# Patient Record
Sex: MALE | Race: WHITE | Employment: FULL TIME | URBAN - METROPOLITAN AREA
[De-identification: names, ages, dates, MRNs, and addresses within clinical notes are randomized per-mention and may not be internally consistent; named-entity substitution may affect disease eponyms.]

---

## 2021-03-01 ENCOUNTER — HOSPITAL ENCOUNTER (OUTPATIENT)
Age: 46
Discharge: HOME | End: 2021-03-01
Payer: COMMERCIAL

## 2021-03-01 VITALS
DIASTOLIC BLOOD PRESSURE: 92 MMHG | TEMPERATURE: 97 F | OXYGEN SATURATION: 97 % | RESPIRATION RATE: 20 BRPM | SYSTOLIC BLOOD PRESSURE: 137 MMHG | HEART RATE: 120 BPM

## 2021-03-01 DIAGNOSIS — S61.214A LACERATION OF RIGHT RING FINGER WITHOUT FOREIGN BODY WITHOUT DAMAGE TO NAIL, INITIAL ENCOUNTER: Primary | ICD-10-CM

## 2021-03-01 PROCEDURE — 12001 RPR S/N/AX/GEN/TRNK 2.5CM/<: CPT

## 2021-03-01 PROCEDURE — 90471 IMMUNIZATION ADMIN: CPT

## 2021-03-01 PROCEDURE — 99203 OFFICE O/P NEW LOW 30 MIN: CPT

## 2021-03-01 NOTE — ED INITIAL ASSESSMENT (HPI)
Laceration to right 4th finger from a knife while washing dishes PTA. Bleeding controlled. + distal CMS. Last TDaP unknown.

## 2021-03-01 NOTE — ED PROVIDER NOTES
Patient Seen in: [facility]      History   Patient presents with:  Laceration/Abrasion    Stated Complaint: Right hand finger cut     HPI/Subjective:   HPI    69-year-old male who is right-hand dominant here for evaluation of right ring finge General: Abdomen is flat. Musculoskeletal: Normal range of motion. Hands:    Skin:     General: Skin is warm. Neurological:      General: No focal deficit present. Mental Status: He is alert and oriented to person, place, and time.    Psy

## 2021-03-08 ENCOUNTER — HOSPITAL ENCOUNTER (OUTPATIENT)
Age: 46
Discharge: HOME | End: 2021-03-08
Attending: EMERGENCY MEDICINE
Payer: COMMERCIAL

## 2021-03-08 VITALS
OXYGEN SATURATION: 98 % | SYSTOLIC BLOOD PRESSURE: 129 MMHG | RESPIRATION RATE: 18 BRPM | HEART RATE: 75 BPM | TEMPERATURE: 98 F | DIASTOLIC BLOOD PRESSURE: 78 MMHG

## 2021-03-08 DIAGNOSIS — S61.219D FINGER LACERATION, SUBSEQUENT ENCOUNTER: Primary | ICD-10-CM

## 2021-03-08 DIAGNOSIS — Z48.02 ENCOUNTER FOR REMOVAL OF SUTURES: ICD-10-CM

## 2021-03-08 NOTE — ED INITIAL ASSESSMENT (HPI)
PATIENT ARRIVED AMBULATORY TO ROOM FOR SUTURE REMOVAL. PATIENT HAD SUTURES PLACED TO THE FINGER ON 3/1. NO REDNESS, SWELLING OR DRAINAGE.

## 2021-03-08 NOTE — ED PROVIDER NOTES
Patient Seen in: [facility]      History   Patient presents with:  Suture Removal    Stated Complaint: STITCHES TAKEN OUT    HPI/Subjective:   HPI    Patient presents for wound check and suture removal of right ring finger laceration sustaine - No data to display       2 sutures removed easily by myself no evidence of infection wound care instructions given         MDM                               Disposition and Plan     Clinical Impression:  Finger laceration, subsequent encounter  (primary

## 2023-01-06 ENCOUNTER — HOSPITAL ENCOUNTER (OUTPATIENT)
Age: 48
Discharge: HOME | End: 2023-01-06
Attending: EMERGENCY MEDICINE
Payer: COMMERCIAL

## 2023-01-06 VITALS
TEMPERATURE: 98 F | SYSTOLIC BLOOD PRESSURE: 149 MMHG | DIASTOLIC BLOOD PRESSURE: 93 MMHG | HEART RATE: 70 BPM | RESPIRATION RATE: 18 BRPM | OXYGEN SATURATION: 99 %

## 2023-01-06 DIAGNOSIS — U07.1 COVID-19: Primary | ICD-10-CM

## 2023-01-06 LAB
POCT INFLUENZA A: NEGATIVE
POCT INFLUENZA B: NEGATIVE
SARS-COV-2 RNA RESP QL NAA+PROBE: DETECTED

## 2023-01-06 PROCEDURE — 87502 INFLUENZA DNA AMP PROBE: CPT | Performed by: EMERGENCY MEDICINE

## 2023-01-06 PROCEDURE — 99213 OFFICE O/P EST LOW 20 MIN: CPT

## 2023-01-06 RX ORDER — NIRMATRELVIR AND RITONAVIR 300-100 MG
KIT ORAL
Qty: 30 TABLET | Refills: 0 | Status: SHIPPED | OUTPATIENT
Start: 2023-01-06

## 2024-07-18 ENCOUNTER — APPOINTMENT (OUTPATIENT)
Dept: GENERAL RADIOLOGY | Age: 49
End: 2024-07-18
Attending: EMERGENCY MEDICINE
Payer: COMMERCIAL

## 2024-07-18 ENCOUNTER — HOSPITAL ENCOUNTER (OUTPATIENT)
Age: 49
Discharge: HOME | End: 2024-07-18
Attending: EMERGENCY MEDICINE
Payer: COMMERCIAL

## 2024-07-18 ENCOUNTER — APPOINTMENT (OUTPATIENT)
Dept: ULTRASOUND IMAGING | Age: 49
End: 2024-07-18
Attending: EMERGENCY MEDICINE
Payer: COMMERCIAL

## 2024-07-18 VITALS
SYSTOLIC BLOOD PRESSURE: 150 MMHG | OXYGEN SATURATION: 98 % | DIASTOLIC BLOOD PRESSURE: 102 MMHG | RESPIRATION RATE: 16 BRPM | HEART RATE: 78 BPM | TEMPERATURE: 98 F

## 2024-07-18 DIAGNOSIS — I10 HYPERTENSION, UNSPECIFIED TYPE: ICD-10-CM

## 2024-07-18 DIAGNOSIS — S76.011A HIP STRAIN, RIGHT, INITIAL ENCOUNTER: Primary | ICD-10-CM

## 2024-07-18 PROCEDURE — 99214 OFFICE O/P EST MOD 30 MIN: CPT

## 2024-07-18 PROCEDURE — 73502 X-RAY EXAM HIP UNI 2-3 VIEWS: CPT | Performed by: EMERGENCY MEDICINE

## 2024-07-18 PROCEDURE — 93971 EXTREMITY STUDY: CPT | Performed by: EMERGENCY MEDICINE

## 2024-07-18 NOTE — ED PROVIDER NOTES
Patient Seen in: [facility] [place]      History     Chief Complaint   Patient presents with    Leg Pain     Stated Complaint: righ leg pain    Subjective:   HPI    The patient is a 49-year-old male with past history of hypertension who presents now with right leg pain.  The patient states he works as a  and spends a lot of time sitting.  Patient states over the last several days, he has developed some pain that seems to initiate in the lateral aspect of the right hip and radiates down the right thigh area.  Patient describes the pain as an aching sensation.  Patient states pain worsens somewhat with ambulation and going up and down steps.  Patient denies any noted obvious swelling.  Patient states if he stands and leans to the right he feels a \"pulling\" sensation down the lateral aspect of the right thigh.  Patient has been taking anti-inflammatories with minimal improvement    Objective:   Past Medical History:    Unspecified essential hypertension              History reviewed. No pertinent surgical history.             Social History     Socioeconomic History    Marital status: Single   Tobacco Use    Smoking status: Never    Smokeless tobacco: Never   Vaping Use    Vaping status: Never Used   Substance and Sexual Activity    Alcohol use: No    Drug use: No              Review of Systems    Positive for stated Chief Complaint: Leg Pain    Other systems are as noted in HPI.  Constitutional and vital signs reviewed.      All other systems reviewed and negative except as noted above.    Physical Exam     ED Triage Vitals [07/18/24 1041]   BP (!) 165/87   Pulse 78   Resp 16   Temp 98.3 °F (36.8 °C)   Temp src Temporal   SpO2 98 %   O2 Device None (Room air)       Current Vitals:   Vital Signs  BP: -- (reports he has not taken BP meds yet today)  Pulse: 78  Resp: 16  Temp: 98.3 °F (36.8 °C)  Temp src: Temporal    Oxygen Therapy  SpO2: 98 %  O2 Device: None (Room air)            Physical  Exam    Constitutional: Well-developed well-nourished in no acute distress  Head: Normocephalic, no swelling or tenderness  Eyes: Nonicteric sclera, no conjunctival injection  Vascular: Palpable right posterior tibial pulse  Neurologic: Patient is awake, alert and oriented ×3.  The patient's motor strength is 5 out of 5 and symmetric in the upper and lower extremities bilaterally  Extremities: There is minimal tenderness to the right lateral hip.  There is no focal bony tenderness of the femur, knee, lower leg.  Skin: No pallor, no redness or warmth to the touch      ED Course   Labs Reviewed - No data to display          Patient's x-ray was independently reviewed by myself.  There is some minimal right hip joint narrowing.  Patient's ultrasound images and the radiologist report demonstrating no DVT of the right lower extremity was reviewed by myself.    Patient's x-rays were discussed with the patient.  Recommend anti-inflammatories for pain, will provide with Ortho follow-up for any worsening right hip pain.  Probable musculoskeletal origin of the patient's pain.         MDM      Musculoskeletal strain versus DVT of the right lower                                   Medical Decision Making      Disposition and Plan     Clinical Impression:  1. Hip strain, right, initial encounter    2. Hypertension, unspecified type         Disposition:  Discharge  7/18/2024 11:57 am    Follow-up:  Sujata Barfield MD  [address],  [place] [address]  [phone]      Worsening hip pain          Medications Prescribed:  Current Discharge Medication List

## 2024-07-31 ENCOUNTER — TELEPHONE (OUTPATIENT)
Dept: ORTHOPEDICS CLINIC | Facility: CLINIC | Age: 49
End: 2024-07-31

## 2024-07-31 ENCOUNTER — OFFICE VISIT (OUTPATIENT)
Dept: ORTHOPEDICS CLINIC | Facility: CLINIC | Age: 49
End: 2024-07-31
Payer: COMMERCIAL

## 2024-07-31 VITALS — HEIGHT: 72 IN | BODY MASS INDEX: 28.85 KG/M2 | WEIGHT: 213 LBS

## 2024-07-31 DIAGNOSIS — M54.16 LUMBAR RADICULOPATHY: ICD-10-CM

## 2024-07-31 DIAGNOSIS — R29.898 RIGHT LEG WEAKNESS: Primary | ICD-10-CM

## 2024-07-31 PROCEDURE — 3008F BODY MASS INDEX DOCD: CPT | Performed by: PHYSICIAN ASSISTANT

## 2024-07-31 PROCEDURE — 99204 OFFICE O/P NEW MOD 45 MIN: CPT | Performed by: PHYSICIAN ASSISTANT

## 2024-07-31 RX ORDER — METHYLPREDNISOLONE 4 MG/1
TABLET ORAL
Qty: 1 EACH | Refills: 0 | Status: SHIPPED | OUTPATIENT
Start: 2024-07-31

## 2024-07-31 RX ORDER — CYCLOBENZAPRINE HCL 10 MG
10 TABLET ORAL 3 TIMES DAILY
Qty: 30 TABLET | Refills: 1 | Status: SHIPPED | OUTPATIENT
Start: 2024-07-31 | End: 2024-08-20

## 2024-07-31 NOTE — PROGRESS NOTES
[facility] New Patient Note    CC: Right thigh and leg pain    HPI: This 49 year old male presents today with complaints of right thigh and leg pain.  Onset of symptoms started approximately 2 weeks ago with no known injury.  He states that he noticed progressive pain in his right quad.  It occasionally goes down the leg.  He also notes associated weakness on the right side.  Pain is worse with going up stairs or up a ladder.  Pain also is worse when he sits for long period of time.  He is a  and does a lot of physical activity and sitting for long periods of time.  He has been seen and evaluated at the urgent care at which time x-rays were completed and they are available for me to review.  He has been taking ibuprofen which helps slightly.  He is here for further evaluation.    Past Medical History:    Unspecified essential hypertension     History reviewed. No pertinent surgical history.  Current Outpatient Medications   Medication Sig Dispense Refill    methylPREDNISolone (MEDROL) 4 MG Oral Tablet Therapy Pack As directed. 1 each 0    cyclobenzaprine 10 MG Oral Tab Take 1 tablet (10 mg total) by mouth 3 (three) times daily for 20 days. 30 tablet 1    nirmatrelvir&ritonavir 300/100 (PAXLOVID, 300/100,) 20 x 150 MG & 10 x 100MG Oral Tablet Therapy Pack Take two nirmatrelvir tablets (300mg) with one ritonavir tablet (100mg) together twice daily for 5 days. 30 tablet 0    naproxen (NAPROSYN) 500 MG Oral Tab Take 1 tablet (500 mg total) by mouth 2 (two) times daily. 30 tablet 2    Lisinopril-hydroCHLOROthiazide 20-12.5 MG Oral Tab Take 2 tablets by mouth daily. 60 tablet 11    atenolol 100 MG Oral Tab Take 1 tablet (100 mg total) by mouth daily. 30 tablet 11     No Known Allergies  Family History   Problem Relation Age of Onset    Diabetes Father 52    Hypertension Father     Heart Disorder Father         CAD/CABG    Stroke Father     Hypertension Mother     Lipids Mother     Hypertension Sister      Diabetes Maternal Grandfather     Cancer Neg      Social History     Occupational History    Not on file   Tobacco Use    Smoking status: Never    Smokeless tobacco: Never   Vaping Use    Vaping status: Never Used   Substance and Sexual Activity    Alcohol use: No    Drug use: No    Sexual activity: Not on file        ROS:  Complete review of symptoms was reviewed and is non-contributory to the chief complaint as mentioned above.      Physical Exam: He is a pleasant 49-year-old male in no acute distress.  He ambulates with minimal antalgic gait.  Exam of the right hip and lower extremity reveals that he has no pain with logroll.  No pain with hip flexion or internal rotation.  No pain with external rotation.  He has limited hip flexion to approximately 100 degrees.  He has weakness with resisted hip flexion and resisted straight leg raise.  Negative straight leg raise.  Sensation is present to light touch.  No tenderness to palpation about the hip.        Imaging: XR HIP W OR WO PELVIS 2 OR 3 VIEWS, RIGHT (CPT=73502)    Result Date: 7/18/2024  CONCLUSION:   No acute fracture or dislocation.  Minimal right hip joint narrowing, suggesting underlying cartilage loss.    Dictated by (CST): Kirsten Chaudhary MD on 7/18/2024 at 11:50 AM     Finalized by (CST): Kirsten Chaudhary MD on 7/18/2024 at 11:51 AM          US VENOUS DOPPLER LEG RIGHT - DIAG IMG (CPT=93971)    Result Date: 7/18/2024  CONCLUSION:  1. Negative right leg venous duplex examination. 2. No deep venous thrombosis.    Dictated by (CST): Daniel Torres MD on 7/18/2024 at 11:09 AM     Finalized by (CST): Daniel Torres MD on 7/18/2024 at 11:10 AM                 Assessment: Right hip and leg pain and weakness, possible radiculopathy      Plan: I discussed x-ray and exam findings with the patient.  He does exhibit some leg weakness, that may be related to an intra-articular source of the hip versus lumbar etiology.  I recommend going ahead with oral  anti-inflammatories including a Medrol Dosepak and muscle relaxers to see if this improves his symptoms.  Prescriptions were sent to the pharmacy.  If he is feeling better with conservative treatment, initiation of formal therapy may be considered in the next few weeks.  Work note was given and he will work with sitdown work only and no lifting.  We also can consider further evaluation of the lumbar source with x-rays and possible MRI.  He will follow-up with us in 3 weeks to see how he is progressing or sooner with questions, concerns, or worsening symptoms.  If he develops worsening pain or difficulty going to the bathroom, he will follow-up urgently.        Yessi Raygoza PA-C  Orthopedic Surgery   [address] [place] [id number]   t: [phone]  f: [phone]

## 2024-08-06 NOTE — TELEPHONE ENCOUNTER
Patient called needing assistance completing Release of Information. Patient will email Release of Information once completed. Patient also requested forms to be uploaded to [employer].

## 2024-08-06 NOTE — TELEPHONE ENCOUNTER
Type of Leave: Continuous  Reason for Leave: right leg pain/weakness  Start date of leave: 7/17/24-pending re-eval 8/28/24  How much time needed?:  6 weeks   Forms Due Date: asap  Was Fee and Turnaround info Given?: yes

## 2024-08-06 NOTE — TELEPHONE ENCOUNTER
Short term disability forms received. [employer] message sent for Authorization/Release of Information. Logged for processing.

## 2024-08-14 NOTE — TELEPHONE ENCOUNTER
Yessi,    Patient's employer could not accommodate light duty work due to his occupation of being a . Patient has been off work, pending re evaluation on 8/28/24. Do you support continuous leave?    Thank you,  Roseanna IRBY

## 2024-08-15 NOTE — TELEPHONE ENCOUNTER
Yessi,     *The ACKNOWLEDGE button has been moved to the top right ribbon*    Please sign off on form if you agree to: disability due to ongoing leg pain, 7/17/24-pending re eval on 8/28/24     (place your signature on the first page only)    -From your Inbasket, Highlight the patient and click Chart   -Double click the 7/31/24 Forms Completion telephone encounter  -Scroll down to the Media section   -Click the blue Hyperlink: Taurus Raygoza 8/15/24  -Click Acknowledge located in the top right ribbon/menu   -Drag the mouse into the blank space of the document and a + sign will appear. Left click to   electronically sign the document.     Thank you,    Roseanna IRBY

## 2024-08-28 ENCOUNTER — OFFICE VISIT (OUTPATIENT)
Dept: ORTHOPEDICS CLINIC | Facility: CLINIC | Age: 49
End: 2024-08-28
Payer: COMMERCIAL

## 2024-08-28 VITALS — WEIGHT: 213 LBS | BODY MASS INDEX: 28.85 KG/M2 | HEIGHT: 72 IN

## 2024-08-28 DIAGNOSIS — R29.898 RIGHT LEG WEAKNESS: Primary | ICD-10-CM

## 2024-08-28 PROCEDURE — 99213 OFFICE O/P EST LOW 20 MIN: CPT | Performed by: PHYSICIAN ASSISTANT

## 2024-08-28 PROCEDURE — 3008F BODY MASS INDEX DOCD: CPT | Performed by: PHYSICIAN ASSISTANT

## 2024-08-28 NOTE — PROGRESS NOTES
[facility] Progress Note      Chief Complaint:  Right thigh and leg pain      Subjective: Salvador Vergara is a 49 year old male who is here for follow-up of his right hip and thigh pain.  He was seen about a month ago and was given a Medrol Dosepak and cyclobenzaprine for his right thigh and leg pain and weakness.  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  Since his last visit, he has noticed improved pain in the right thigh and leg.  Pain is now localized  in the thigh and is worse with weightbearing activities.  He also notes some occasions of giving way.      Objective: Exam of the right hip and lower extremity reveals that he is nontender to palpation.  He has no pain with hip flexion.  He has discomfort with hip internal rotation to 20 degrees.  No pain with hip external rotation.  He has no weakness on resisted hip flexion.  No pain or weakness with resisted knee extension.  He has mild discomfort with resisted knee flexion.  Sensation is present to light touch.  No pain or weakness with resisted hip abduction.      Assessment: Right hip and thigh pain, possible early degenerative joint disease of the right hip      Plan: Overall he is doing better.  I recommend initiation of a course of physical therapy to help with pain and strengthening exercises.  We will continue his sitdown work only and will follow-up with me in 4 weeks for reevaluation in hopes of return to full duty.  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  He will continue with conservative management as discussed and follow-up sooner with any other questions, concerns, or worsening symptoms.        Yessi Raygoza PA-C  Orthopedic Surgery   [address] [place] [id number]   t: [phone]  f: [phone]           This document was partially prepared using Dragon [employer] voice recognition software.   Although every attempt is made to correct errors during dictation, discrepancies may still exist. Please contact me with any questions or clarifications.

## 2024-08-29 ENCOUNTER — PATIENT MESSAGE (OUTPATIENT)
Dept: ORTHOPEDICS CLINIC | Facility: CLINIC | Age: 49
End: 2024-08-29

## 2024-08-29 DIAGNOSIS — R29.898 RIGHT LEG WEAKNESS: Primary | ICD-10-CM

## 2024-08-29 DIAGNOSIS — M25.551 RIGHT HIP PAIN: ICD-10-CM

## 2024-08-29 NOTE — TELEPHONE ENCOUNTER
From: Salvador Vergara  To: Yessi Raygoza  Sent: 8/29/2024 8:30 AM CDT  Subject: Physical therapy order    Good morning. I called over to the [place] location regarding the physical therapy we talked about yesterday. They do not show the order for physical therapy in the system as of this morning. Would that order be put in the system today ???.

## 2024-09-10 ENCOUNTER — TELEPHONE (OUTPATIENT)
Dept: PHYSICAL THERAPY | Facility: HOSPITAL | Age: 49
End: 2024-09-10

## 2024-09-11 ENCOUNTER — OFFICE VISIT (OUTPATIENT)
Dept: PHYSICAL THERAPY | Age: 49
End: 2024-09-11
Attending: PHYSICIAN ASSISTANT
Payer: COMMERCIAL

## 2024-09-11 DIAGNOSIS — R29.898 RIGHT LEG WEAKNESS: Primary | ICD-10-CM

## 2024-09-11 DIAGNOSIS — M25.551 RIGHT HIP PAIN: ICD-10-CM

## 2024-09-11 PROCEDURE — 97530 THERAPEUTIC ACTIVITIES: CPT | Performed by: PHYSICAL THERAPIST

## 2024-09-11 PROCEDURE — 97161 PT EVAL LOW COMPLEX 20 MIN: CPT | Performed by: PHYSICAL THERAPIST

## 2024-09-11 NOTE — PROGRESS NOTES
LOWER EXTREMITY EVALUATION:     Diagnosis:   Right leg weakness (R29.898)  Right hip pain (M25.551)      Referring Provider: Yessi Raygoza PA-C Date of Evaluation:    9/11/2024    Precautions:  None Next MD visit:   9/26/2024  Date of Surgery: n/a     PATIENT SUMMARY   Salvador Vergara is a 49 year old male who presents to therapy today with complaints of R hip pain and weakness that started at end of July 2024 with no DONELL. 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 Most of the pain is in the R lateral hip but has improves since taking a steroid pack. Pt reports no lumbar injuries and denies any N/T. Pt reports most of the hip pain is with weight-bearing positions such as walking and negotiating steps.  Pt describes pain level current 3/10, at best 3/10, at worst 5-6/10.   Current functional limitations include negotiating stairs due to weakness, walking for long periods of time.    Salvador describes prior level of function as good. Pt goals include negotiating stairs for return to work and being able to walk without weakness or pain.  Past medical history was reviewed with Salvador. Significant findings include HTN.    ASSESSMENT  Salvador presents to physical therapy evaluation with primary c/o R hip pain and weakness. The results of the objective tests and measures show decreased R hip ROM, hip stabilizer strength, LE muscle length, mild iliofemoral joint mobility restrictions and abnormal gait.  Functional deficits include but are not limited to negotiating stairs for getting into his home and in the truck for work and walking for long periods of time.  Signs and symptoms are consistent with diagnosis of Right leg weakness (R29.898), Right hip pain (M25.551). Pt and PT discussed evaluation findings, pathology, POC and HEP.  Pt voiced understanding and performs HEP correctly without reported pain. Skilled  Physical Therapy is medically necessary to address the above impairments and reach functional goals.     OBJECTIVE:   Observation: Pt ambulates with decreased WB on RLE, stopped posture  Palpation: TTP: R :  Gluteus Medius, Greater Trochanter, ITB  Sensation: WNL    AROM: (* denotes performed with pain)  Hip   Flexion: R 85*/120; L 85/120  Extension: R 10*/15; L 10/15  Abduction: R 45*/45; L 37/45  ER: R 45/60; L 45/60  IR: R 20*/45; L 30/45     Accessory motion: R  Long Axis Hip Distraction: mild hypomobility  Inferior Glide of Femur on Acetabulum: normal mobility  Inferior Lateral Glide of Femur on Acetabulum: normal mobility    Flexibility: Hip/LE     R L   Hamstrings moderately restricted moderately restricted   Piriformis moderately restricted moderately restricted   Hip Flexor moderately restricted moderately restricted   TFL moderately restricted moderately restricted   Quads moderately restricted moderately restricted   Gastrocs moderately restricted moderately restricted       Strength/MMT: (* denotes performed with pain)  Hip Knee Foot/Ankle   Flexion: R 4*/5; L 5/5  Abduction: R 4/5; L 5/5  ER: R 4/5; L 5/5  IR: R 4*/5; L 5/5 Flexion: R 4+*/5; L 5/5  Extension: R 4+/5; L 5/5    DF: R 5/5; L 5/5  PF: R 5/5; L 5/5     Special tests:   FADIR test: (-)  Hip Quadrant Test: (-)    Gait: pt ambulates on level ground with decreased hip/knee flex or ext R and stooped posture/forward lean  Balance: SLS: R 25 with pain and weakness sec, L 30 sec    Today’s Treatment and Response:   Pt education was provided on exam findings, treatment diagnosis, treatment plan, expectations, and prognosis. Pt was also provided recommendations for possible soreness after evaluation and importance of remaining active.  Patient was instructed in and issued a HEP for:   Access Code: [email or web address]  URL: [email or web address]  Date: 09/11/2024  Prepared by: Jessica Madsen    Exercises  - Supine March with Resistance Band  - 1  x daily - 7 x weekly - 2 sets - 10 reps  - Clamshell with Resistance  - 1 x daily - 7 x weekly - 3 sets - 10 reps  - Supine Piriformis Stretch with Foot on Ground  - 1 x daily - 7 x weekly - 3 sets - 30 second hold  - Standing Marching  - 1 x daily - 7 x weekly - 2 sets - 10 reps    Charges: PT Eval Low Complexity      Total Timed Treatment: 10 min     Total Treatment Time: 30 min   Therex: 0 minutes  Theract: 10 minutes  NMR: 0 minutes  Manual Therapy: 0 minutes    Based on clinical rationale and outcome measures, this evaluation involved Low Complexity decision making due to 1-2 personal factors/comorbidities,  body structures involved/activity limitations, and unstable symptoms including changing pain levels.  PLAN OF CARE:    Goals: (to be met in 5-8 visits)  Pt will have improved hip AROM Flex to 90+ deg and ABD to 45 deg to be able to don/doff shoes and perform car transfers without difficulty   Pt will improve hip ABD and ER strength to 5/5 to increase ease with standing and walking   Pt will be able to squat to  light objects around the house with <1/10 hip pain   Pt will improve functional hip strength to report ability to ascend/descend 1 flight of stairs reciprocally without use of handrail   Pt will demonstrate improved SLS to >30 seconds LEYLA to promote safety and decrease risk of falls on uneven surfaces such as grass and gravel   Pt will be independent and compliant with comprehensive HEP to m      Frequency / Duration: Patient will be seen for 2 x/week or a total of 5-8 visits over a 90 day period. Treatment will include: Gait training, Manual Therapy, Neuromuscular Re-education, Self-Care Home Management, Therapeutic Activities, Therapeutic Exercise, and Home Exercise Program instruction    Education or treatment limitation: None  Rehab Potential:good    Patient/Family/Caregiver was advised of these findings, precautions, and treatment options and has agreed to actively participate in planning  and for this course of care.    Thank you for your referral. Please co-sign or sign and return this letter via fax as soon as possible to [phone]. If you have any questions, please contact me at Dept: [phone]    Sincerely,  Electronically signed by therapist: Jessica Madsen PT  Physician's certification required: Yes  I certify the need for these services furnished under this plan of treatment and while under my care.    X___________________________________________________ Date____________________    Certification From: 9/11/2024  To:12/10/2024

## 2024-09-16 ENCOUNTER — OFFICE VISIT (OUTPATIENT)
Dept: PHYSICAL THERAPY | Age: 49
End: 2024-09-16
Attending: PHYSICIAN ASSISTANT
Payer: COMMERCIAL

## 2024-09-16 DIAGNOSIS — R29.898 RIGHT LEG WEAKNESS: Primary | ICD-10-CM

## 2024-09-16 DIAGNOSIS — M25.551 RIGHT HIP PAIN: ICD-10-CM

## 2024-09-16 PROCEDURE — 97110 THERAPEUTIC EXERCISES: CPT | Performed by: PHYSICAL THERAPIST

## 2024-09-16 PROCEDURE — 97530 THERAPEUTIC ACTIVITIES: CPT | Performed by: PHYSICAL THERAPIST

## 2024-09-16 PROCEDURE — 97140 MANUAL THERAPY 1/> REGIONS: CPT | Performed by: PHYSICAL THERAPIST

## 2024-09-16 NOTE — PROGRESS NOTES
Diagnosis:   Right leg weakness (R29.898)  Right hip pain (M25.551)        Referring Provider: Yessi Raygoza. QI Date of Evaluation:    9/11/2024    Precautions:  None Next MD visit:   9/26/2024  Date of Surgery: n/a   Insurance Primary/Secondary: [employer] OUT OF STATE / N/A     # Auth Visits: 5-8            Subjective: Pt reports \"the hip is feeling a little better, I am able to carry some things up the stairs at home, exercises are going well at home.\"    Pain: 4/10      Objective: See table below.      Assessment: Pt responded well to therapeutic interventions with some discomfort with hip flexion more than 90 degrees. Only 1 set of monster walking performed due to greater trochanter pain. 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      Goals:   (to be met in 5-8 visits)  Pt will have improved hip AROM Flex to 90+ deg and ABD to 45 deg to be able to don/doff shoes and perform car transfers without difficulty   Pt will improve hip ABD and ER strength to 5/5 to increase ease with standing and walking   Pt will be able to squat to  light objects around the house with <1/10 hip pain   Pt will improve functional hip strength to report ability to ascend/descend 1 flight of stairs reciprocally without use of handrail   Pt will demonstrate improved SLS to >30 seconds LEYLA to promote safety and decrease risk of falls on uneven surfaces such as grass and gravel   Pt will be independent and compliant with comprehensive HEP    Plan: Progress skilled PT as tolerated to help improve hip strength and mobility.   Date: 9/16/2024  TX#: 2/5-8 Date:                 TX#: 3/ Date:                 TX#: 4/ Date:                 TX#: 5/ Date:   Tx#: 6/   Therex: x25'  NuStep: x5' L3  HS Stretch: 3x30\" manually R  Piriformis Stretch:3x30\" manually R  Eccentric Leg Press: 1x15 R 4 cords  Forward  Step-Ups: 1x15 R  Seated Hip Flexion: 1x15 R 4#  Lateral Walking: 2x100 ft with GTB  Monster Walking: 1x100 ftt with        Theract: x10'  Bridges: 1x15  SL Clams: 2x10 R   Eccentric Step-Downs: 1x15 with LUE support       NMR: x0'       Manual Therapy: x10'  STW: R ITB, piriformis, hip flexor       HEP:   Exercises  - Supine March with Resistance Band  - 1 x daily - 7 x weekly - 2 sets - 10 reps  - Clamshell with Resistance  - 1 x daily - 7 x weekly - 3 sets - 10 reps  - Supine Piriformis Stretch with Foot on Ground  - 1 x daily - 7 x weekly - 3 sets - 30 second hold  - Standing Marching  - 1 x daily - 7 x weekly - 2 sets - 10 reps  - Sidelying Hip Abduction  - 1 x daily - 7 x weekly - 2 sets - 10 reps  - Step Up  - 1 x daily - 7 x weekly - 15 reps  - Forward Step Down  - 1 x daily - 7 x weekly - 1 sets - 15 reps    Charges: Therex: 25 minutes  Theract: 10 minutes  NMR: 0 minutes  Manual Therapy: 10 minutes  Total Timed Treatment: 45 min  Total Treatment Time: 45 min

## 2024-09-18 ENCOUNTER — OFFICE VISIT (OUTPATIENT)
Dept: PHYSICAL THERAPY | Age: 49
End: 2024-09-18
Attending: PHYSICIAN ASSISTANT
Payer: COMMERCIAL

## 2024-09-18 DIAGNOSIS — R29.898 RIGHT LEG WEAKNESS: Primary | ICD-10-CM

## 2024-09-18 DIAGNOSIS — M25.551 RIGHT HIP PAIN: ICD-10-CM

## 2024-09-18 PROCEDURE — 97530 THERAPEUTIC ACTIVITIES: CPT | Performed by: PHYSICAL THERAPIST

## 2024-09-18 PROCEDURE — 97140 MANUAL THERAPY 1/> REGIONS: CPT | Performed by: PHYSICAL THERAPIST

## 2024-09-18 PROCEDURE — 97112 NEUROMUSCULAR REEDUCATION: CPT | Performed by: PHYSICAL THERAPIST

## 2024-09-18 PROCEDURE — 97110 THERAPEUTIC EXERCISES: CPT | Performed by: PHYSICAL THERAPIST

## 2024-09-18 NOTE — PROGRESS NOTES
Diagnosis:   Right leg weakness (R29.898)  Right hip pain (M25.551)        Referring Provider: Yessi Raygoza. QI Date of Evaluation:    9/11/2024    Precautions:  None Next MD visit:   9/26/2024  Date of Surgery: n/a   Insurance Primary/Secondary: BCBS OUT OF STATE / N/A     # Auth Visits: 5-8            Subjective: Pt reports \"the hip is improving, I was a little sore after last session; however, I feel like the rolling helped, the stairs get better each time I do them.\"    Pain: 3/10      Objective: See table below.      Assessment: Pt responded well to therapeutic interventions with no increase in pain. Pt fatigued easily to seated hip flexion with hold at top. Added SLR to address quad and hip flexor strength. Quad shakiness noted with LSD today.       Goals:   (to be met in 5-8 visits)  Pt will have improved hip AROM Flex to 90+ deg and ABD to 45 deg to be able to don/doff shoes and perform car transfers without difficulty   Pt will improve hip ABD and ER strength to 5/5 to increase ease with standing and walking   Pt will be able to squat to  light objects around the house with <1/10 hip pain   Pt will improve functional hip strength to report ability to ascend/descend 1 flight of stairs reciprocally without use of handrail   Pt will demonstrate improved SLS to >30 seconds LEYLA to promote safety and decrease risk of falls on uneven surfaces such as grass and gravel   Pt will be independent and compliant with comprehensive HEP    Plan: Progress skilled PT as tolerated to help improve hip strength and mobility.   Date: 9/16/2024  TX#: 2/5-8 Date: 9/18/2024                TX#: 3/5-8 Date:                 TX#: 4/ Date:                 TX#: 5/ Date:   Tx#: 6/   Therex: x25'  NuStep: x5' L3  HS Stretch: 3x30\" manually R  Piriformis Stretch:3x30\" manually R  Eccentric Leg Press: 1x15 R 4 cords  Forward Step-Ups: 1x15 R  Seated Hip Flexion: 1x15 R 4#  Lateral Walking: 2x100 ft with GTB  Monster Walking:  1x100 ft with GTB Therex: x10'  NuStep: x5' L5  HS Stretch: 3x30\" manually R  Piriformis Stretch:3x30\" manually R  Eccentric Leg Press: 1x15 R 5 cords  Seated Hip Flexion: 1x15 R 4# hold 2\" at top  Lateral Walking: 2x100 ft with GTB - NP  Monster Walking: 1x100 ft with GTB - NP  Lateral Step-Ups and Overs: 1x10 with 6\" box      Theract: x10'  Bridges: 1x15  SL Clams: 2x10 R   Eccentric Step-Downs: 1x15 with LUE support Theract: x10'  Bridges: 1x15 with hip abduction and GTB  SL Clams: 2x10 R GTB  Eccentric Step-Downs: 1x15 with LUE support  SLR: 1x15 with 2# R  LSD: 1x10 4\" step      NMR: x0' NMR: x10'  Wobbleboard: 2x1' A/P and M/L  Step Up: 1x10 R with 2\" hold at top on Airex      Manual Therapy: x10'  STW: R ITB, piriformis, hip flexor Manual Therapy: x10'  STW: R ITB, piriformis, hip flexor      HEP:   Exercises  - Supine March with Resistance Band  - 1 x daily - 7 x weekly - 2 sets - 10 reps  - Clamshell with Resistance  - 1 x daily - 7 x weekly - 3 sets - 10 reps  - Supine Piriformis Stretch with Foot on Ground  - 1 x daily - 7 x weekly - 3 sets - 30 second hold  - Standing Marching  - 1 x daily - 7 x weekly - 2 sets - 10 reps  - Sidelying Hip Abduction  - 1 x daily - 7 x weekly - 2 sets - 10 reps  - Step Up  - 1 x daily - 7 x weekly - 15 reps  - Forward Step Down  - 1 x daily - 7 x weekly - 1 sets - 15 reps    Charges: Therex: 10 minutes  Theract: 10 minutes  NMR: 10 minutes  Manual Therapy: 10 minutes  Total Timed Treatment: 40 min  Total Treatment Time: 40 min

## 2024-09-23 ENCOUNTER — OFFICE VISIT (OUTPATIENT)
Dept: PHYSICAL THERAPY | Age: 49
End: 2024-09-23
Attending: PHYSICIAN ASSISTANT
Payer: COMMERCIAL

## 2024-09-23 DIAGNOSIS — R29.898 RIGHT LEG WEAKNESS: Primary | ICD-10-CM

## 2024-09-23 DIAGNOSIS — M25.551 RIGHT HIP PAIN: ICD-10-CM

## 2024-09-23 PROCEDURE — 97530 THERAPEUTIC ACTIVITIES: CPT | Performed by: PHYSICAL THERAPIST

## 2024-09-23 PROCEDURE — 97112 NEUROMUSCULAR REEDUCATION: CPT | Performed by: PHYSICAL THERAPIST

## 2024-09-23 PROCEDURE — 97110 THERAPEUTIC EXERCISES: CPT | Performed by: PHYSICAL THERAPIST

## 2024-09-23 PROCEDURE — 97140 MANUAL THERAPY 1/> REGIONS: CPT | Performed by: PHYSICAL THERAPIST

## 2024-09-23 NOTE — PROGRESS NOTES
Diagnosis:   Right leg weakness (R29.898)  Right hip pain (M25.551)        Referring Provider: Yessi Raygoza. QI Date of Evaluation:    9/11/2024    Precautions:  None Next MD visit:   9/26/2024  Date of Surgery: n/a   Insurance Primary/Secondary: BCBS OUT OF STATE / N/A     # Auth Visits: 5-8            Subjective: Pt reports \"the hip continues to improve, I only felt a little bit of soreness that did not last long after the last session - exercises are going well at home.\"    Pain: 0/10      Objective: See table below.      Assessment: Pt responded well to therapeutic interventions with no increase in pain. 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      Goals:   (to be met in 5-8 visits)  Pt will have improved hip AROM Flex to 90+ deg and ABD to 45 deg to be able to don/doff shoes and perform car transfers without difficulty   Pt will improve hip ABD and ER strength to 5/5 to increase ease with standing and walking   Pt will be able to squat to  light objects around the house with <1/10 hip pain   Pt will improve functional hip strength to report ability to ascend/descend 1 flight of stairs reciprocally without use of handrail   Pt will demonstrate improved SLS to >30 seconds LEYLA to promote safety and decrease risk of falls on uneven surfaces such as grass and gravel   Pt will be independent and compliant with comprehensive HEP    Plan: Progress skilled PT as tolerated to help improve hip strength and mobility.   Date: 9/16/2024  TX#: 2/5-8 Date: 9/18/2024                TX#: 3/5-8 Date: 9/23/2024                TX#: 4/5-8 Date:                 TX#: 5/ Date:   Tx#: 6/   Therex: x25'  NuStep: x5' L3  HS Stretch: 3x30\" manually R  Piriformis Stretch:3x30\" manually R  Eccentric Leg Press: 1x15 R 4 cords  Forward Step-Ups: 1x15 R  Seated Hip Flexion: 1x15 R 4#  Lateral Walking: 2x100 ft with  GTB  Monster Walking: 1x100 ft with GTB Therex: x10'  NuStep: x5' L5  HS Stretch: 3x30\" manually R  Piriformis Stretch:3x30\" manually R  Eccentric Leg Press: 1x15 R 5 cords  Seated Hip Flexion: 1x15 R 4# hold 2\" at top  Lateral Walking: 2x100 ft with GTB - NP  Monster Walking: 1x100 ft with GTB - NP  Lateral Step-Ups and Overs: 1x10 with 6\" box Therex: x10'  NuStep: x5' L5  HS Stretch: 3x30\" manually R  Piriformis Stretch:3x30\" manually R  SL Leg Press: 1x15 R 5 cords  Seated Hip Flexion: 1x15 R 4# hold 2\" at top  Lateral Walking: 2x100 ft with GTB - NP  Monster Walking: 1x100 ft with GTB - NP  Lateral Step-Ups and Overs: 1x10 with 6\" box     Theract: x10'  Bridges: 1x15  SL Clams: 2x10 R   Eccentric Step-Downs: 1x15 with LUE support Theract: x10'  Bridges: 1x15 with hip abduction and GTB  SL Clams: 2x10 R GTB  Eccentric Step-Downs: 1x15 with LUE support 6\" box  SLR: 1x15 with 2# R  LSD: 1x10 4\" step Theract: x10'  Bridges: 1x15 with hip abduction and GTB  SL Clams: 2x10 R GTB  Eccentric Step-Downs: 1x10 with LUE support   SLR: 1x15 with 2# R  LSD: 1x10 4\" step  Mini Squats: 1x15 at barre     NMR: x0' NMR: x10'  Wobbleboard: 2x1' A/P and M/L  Step Up: 1x10 R with 2\" hold at top on Airex NMR: x10'  Wobbleboard: 2x1' A/P and M/L  Step Ups: 1x10 R with 2\" hold at top on Airex     Manual Therapy: x10'  STW: R ITB, piriformis, hip flexor Manual Therapy: x10'  STW: R ITB, piriformis, hip flexor Manual Therapy: x10'  STW: R ITB, piriformis, hip flexor     HEP:   Exercises  - Supine March with Resistance Band  - 1 x daily - 7 x weekly - 2 sets - 10 reps  - Clamshell with Resistance  - 1 x daily - 7 x weekly - 3 sets - 10 reps  - Supine Piriformis Stretch with Foot on Ground  - 1 x daily - 7 x weekly - 3 sets - 30 second hold  - Standing Marching  - 1 x daily - 7 x weekly - 2 sets - 10 reps  - Sidelying Hip Abduction  - 1 x daily - 7 x weekly - 2 sets - 10 reps  - Step Up  - 1 x daily - 7 x weekly - 15 reps  - Forward Step  Down  - 1 x daily - 7 x weekly - 1 sets - 15 reps    Charges: Therex: 10 minutes  Theract: 10 minutes  NMR: 10 minutes  Manual Therapy: 10 minutes  Total Timed Treatment: 40 min  Total Treatment Time: 40 min

## 2024-09-25 ENCOUNTER — OFFICE VISIT (OUTPATIENT)
Dept: PHYSICAL THERAPY | Age: 49
End: 2024-09-25
Attending: PHYSICIAN ASSISTANT
Payer: COMMERCIAL

## 2024-09-25 DIAGNOSIS — M25.551 RIGHT HIP PAIN: ICD-10-CM

## 2024-09-25 DIAGNOSIS — R29.898 RIGHT LEG WEAKNESS: Primary | ICD-10-CM

## 2024-09-25 PROCEDURE — 97140 MANUAL THERAPY 1/> REGIONS: CPT | Performed by: PHYSICAL THERAPIST

## 2024-09-25 PROCEDURE — 97110 THERAPEUTIC EXERCISES: CPT | Performed by: PHYSICAL THERAPIST

## 2024-09-25 PROCEDURE — 97530 THERAPEUTIC ACTIVITIES: CPT | Performed by: PHYSICAL THERAPIST

## 2024-09-25 PROCEDURE — 97112 NEUROMUSCULAR REEDUCATION: CPT | Performed by: PHYSICAL THERAPIST

## 2024-09-25 NOTE — PROGRESS NOTES
Diagnosis:   Right leg weakness (R29.898)  Right hip pain (M25.551)        Referring Provider: Yessi Raygoza. QI Date of Evaluation:    9/11/2024    Precautions:  None Next MD visit:   9/26/2024  Date of Surgery: n/a   Insurance Primary/Secondary: BCBS OUT OF STATE / N/A     # Auth Visits: 5-8            Subjective: Pt reports \"the hip has improved 95% - I feel confident returning to work.\"    Pain: 0/10     Discharge Summary  Pt has attended 5 visits in Physical Therapy. Pt reports a 95% improvement in strength and symptoms after starting therapy. Pt reports he feels confident returning to work. Pt provided with comprehensive HEP for continued strengthening and stretching at home.      Objective: See table below.      Assessment: Pt responded well to therapeutic interventions with no increase in pain. 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      Goals:   (to be met in 5-8 visits)  Pt will have improved hip AROM Flex to 90+ deg and ABD to 45 deg to be able to don/doff shoes and perform car transfers without difficulty   Pt will improve hip ABD and ER strength to 5/5 to increase ease with standing and walking   Pt will be able to squat to  light objects around the house with <1/10 hip pain   Pt will improve functional hip strength to report ability to ascend/descend 1 flight of stairs reciprocally without use of handrail   Pt will demonstrate improved SLS to >30 seconds LEYLA to promote safety and decrease risk of falls on uneven surfaces such as grass and gravel   Pt will be independent and compliant with comprehensive HEP    Date: 9/16/2024  TX#: 2/5-8 Date: 9/18/2024                TX#: 3/5-8 Date: 9/23/2024                TX#: 4/5-8 Date: 9/25/2024                 TX#: 5/5-8 Date:   Tx#: 6/   Therex: x25'  NuStep: x5' L3  HS Stretch: 3x30\" manually R  Piriformis Stretch:3x30\" manually  R  Eccentric Leg Press: 1x15 R 4 cords  Forward Step-Ups: 1x15 R  Seated Hip Flexion: 1x15 R 4#  Lateral Walking: 2x100 ft with GTB  Monster Walking: 1x100 ft with GTB Therex: x10'  NuStep: x5' L5  HS Stretch: 3x30\" manually R  Piriformis Stretch:3x30\" manually R  Eccentric Leg Press: 1x15 R 5 cords  Seated Hip Flexion: 1x15 R 4# hold 2\" at top  Lateral Walking: 2x100 ft with GTB - NP  Monster Walking: 1x100 ft with GTB - NP  Lateral Step-Ups and Overs: 1x10 with 6\" box Therex: x10'  NuStep: x5' L5  HS Stretch: 3x30\" manually R  Piriformis Stretch:3x30\" manually R  SL Leg Press: 1x15 R 5 cords  Seated Hip Flexion: 1x15 R 4# hold 2\" at top  Lateral Walking: 2x100 ft with GTB - NP  Monster Walking: 1x100 ft with GTB - NP  Lateral Step-Ups and Overs: 1x10 with 6\" box Therex: x10'  NuStep: x5' L5  HS Stretch: 3x30\" manually R  Piriformis Stretch:3x30\" manually R  SL Leg Press: 1x15 R 5 cords  Seated Hip Flexion: 1x15 R 4# hold 2\" at top  Lateral Walking: 2x100 ft with GTB - NP  Monster Walking: 1x100 ft with GTB - NP  Lateral Step-Ups and Overs: 1x10 with 6\" box    Theract: x10'  Bridges: 1x15  SL Clams: 2x10 R   Eccentric Step-Downs: 1x15 with LUE support Theract: x10'  Bridges: 1x15 with hip abduction and GTB  SL Clams: 2x10 R GTB  Eccentric Step-Downs: 1x15 with LUE support 6\" box  SLR: 1x15 with 2# R  LSD: 1x10 4\" step Theract: x10'  Bridges: 1x15 with hip abduction and GTB  SL Clams: 2x10 R GTB  Eccentric Step-Downs: 1x10 with LUE support   SLR: 1x15 with 2# R  LSD: 1x10 4\" step  Mini Squats: 1x15 at barre Theract: x10'  Bridges: 1x15 with hip abduction and GTB  SL Clams: 2x10 R GTB  Eccentric Step-Downs: 1x10 with LUE support   SLR: 1x15 with 2# R  LSD: 1x10 4\" step  Mini Squats: 1x15 at barre    NMR: x0' NMR: x10'  Wobbleboard: 2x1' A/P and M/L  Step Up: 1x10 R with 2\" hold at top on Airex NMR: x10'  Wobbleboard: 2x1' A/P and M/L  Step Ups: 1x10 R with 2\" hold at top on Airex NMR: x10'  Wobbleboard: 2x1' A/P and  M/L  Step Ups: 1x10 R with 2\" hold at top on Airex    Manual Therapy: x10'  STW: R ITB, piriformis, hip flexor Manual Therapy: x10'  STW: R ITB, piriformis, hip flexor Manual Therapy: x10'  STW: R ITB, piriformis, hip flexor Manual Therapy: x10'  STW: R ITB, piriformis, hip flexor    HEP: Access Code: [email or web address]  URL: [email or web address]  Date: 09/25/2024  Prepared by: Jessica Madsen    Exercises  - Supine Hamstring Stretch with Strap  - 1 x daily - 7 x weekly - 3 sets - 30 second hold  - Supine Piriformis Stretch with Foot on Ground  - 1 x daily - 7 x weekly - 3 sets - 30 second hold  - Seated Hip Flexion March with Ankle Weights  - 1 x daily - 7 x weekly - 1 sets - 20 reps  - Lateral Step Down  - 1 x daily - 7 x weekly - 2 sets - 10 reps  - Step Up  - 1 x daily - 7 x weekly - 2 sets - 10 reps  - Forward Step Down  - 1 x daily - 7 x weekly - 2 sets - 10 reps  - Lateral Step Up  - 1 x daily - 7 x weekly - 2 sets - 10 reps  - Bridge with Hip Abduction and Resistance  - 1 x daily - 7 x weekly - 2 sets - 10 reps  - Active Straight Leg Raise with Quad Set  - 1 x daily - 7 x weekly - 2 sets - 10 reps  - Mini Squat with Counter Support  - 1 x daily - 7 x weekly - 2 sets - 10 reps    Charges: Therex: 10 minutes  Theract: 10 minutes  NMR: 10 minutes  Manual Therapy: 10 minutes  Total Timed Treatment: 40 min  Total Treatment Time: 40 min    Plan: Pt discharged from skilled PT. Pt provided with comprehensive HEP for continued strengthening and stretching at home.    Patient/Family/Caregiver was advised of these findings, precautions, and treatment options and has agreed to actively participate in planning and for this course of care.    Thank you for your referral. If you have any questions, please contact me at Dept: [phone].    Sincerely,  Electronically signed by therapist: Jessica Madsen PT     Physician's certification required:  No  Please co-sign or sign and return this letter via fax as soon as  possible to [phone].   I certify the need for these services furnished under this plan of treatment and while under my care.    X___________________________________________________ Date____________________    Certification From: 9/25/2024  To:12/24/2024

## 2024-09-26 ENCOUNTER — OFFICE VISIT (OUTPATIENT)
Dept: ORTHOPEDICS CLINIC | Facility: CLINIC | Age: 49
End: 2024-09-26
Payer: COMMERCIAL

## 2024-09-26 VITALS — HEIGHT: 72 IN | WEIGHT: 213 LBS | BODY MASS INDEX: 28.85 KG/M2

## 2024-09-26 DIAGNOSIS — M25.551 RIGHT HIP PAIN: Primary | ICD-10-CM

## 2024-09-26 PROCEDURE — 99213 OFFICE O/P EST LOW 20 MIN: CPT | Performed by: PHYSICIAN ASSISTANT

## 2024-09-26 PROCEDURE — 3008F BODY MASS INDEX DOCD: CPT | Performed by: PHYSICIAN ASSISTANT

## 2024-09-26 NOTE — PROGRESS NOTES
[facility] Progress Note      Chief Complaint:  Right thigh and leg pain      Subjective: Salvador Vergara is a 49 year old male who is here for follow-up of his right hip and thigh pain.  He has tried a Medrol Dosepak and cyclobenzaprine as well as formal physical therapy.  He states that his pain has completely resolved.  He is very happy with his progress in physical therapy.  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  He believes that he is able to return without restrictions.         Objective: Exam of the right hip and lower extremity reveals that he is nontender to palpation at the lateral hip.  He has no pain with hip flexion, internal or external rotation.  Negative straight leg raise.  Good strength with no pain on resisted hip flexion or abduction.  Sensation is present to light touch.      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  He will continue a home exercise program and is able to return to work without restrictions starting next week.  A work note was given.  He will follow-up with me with any worsening symptoms, questions or concerns.        Yessi Raygoza PA-C  Orthopedic Surgery   [address] [place] [id number]   t: [phone]  f: [phone]           This document was partially prepared using [employer] voice recognition software.  Although every attempt is made to correct errors during dictation, discrepancies may still exist. Please contact me with any questions or clarifications.

## 2024-10-03 ENCOUNTER — TELEPHONE (OUTPATIENT)
Dept: ORTHOPEDICS CLINIC | Facility: CLINIC | Age: 49
End: 2024-10-03

## 2024-10-03 NOTE — TELEPHONE ENCOUNTER
Return to work forms received via [employer] message on 10/2/24. [employer] message sent for authorization. Logged for processing.

## 2024-10-17 NOTE — TELEPHONE ENCOUNTER
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 Left message to call back. Form archived

## 2025-07-09 ENCOUNTER — HOSPITAL ENCOUNTER (OUTPATIENT)
Age: 50
Discharge: HOME | End: 2025-07-09
Attending: STUDENT IN AN ORGANIZED HEALTH CARE EDUCATION/TRAINING PROGRAM
Payer: COMMERCIAL

## 2025-07-09 ENCOUNTER — APPOINTMENT (OUTPATIENT)
Dept: ULTRASOUND IMAGING | Age: 50
End: 2025-07-09
Attending: STUDENT IN AN ORGANIZED HEALTH CARE EDUCATION/TRAINING PROGRAM
Payer: COMMERCIAL

## 2025-07-09 VITALS
OXYGEN SATURATION: 99 % | HEIGHT: 72 IN | TEMPERATURE: 98 F | WEIGHT: 220 LBS | BODY MASS INDEX: 29.8 KG/M2 | RESPIRATION RATE: 16 BRPM | HEART RATE: 52 BPM | DIASTOLIC BLOOD PRESSURE: 79 MMHG | SYSTOLIC BLOOD PRESSURE: 189 MMHG

## 2025-07-09 DIAGNOSIS — M79.604 PAIN OF RIGHT LOWER EXTREMITY: Primary | ICD-10-CM

## 2025-07-09 DIAGNOSIS — R03.0 ELEVATED BLOOD PRESSURE READING: ICD-10-CM

## 2025-07-09 PROCEDURE — 99214 OFFICE O/P EST MOD 30 MIN: CPT

## 2025-07-09 PROCEDURE — 93971 EXTREMITY STUDY: CPT | Performed by: RADIOLOGY

## 2025-07-09 RX ORDER — ACETAMINOPHEN 325 MG/1
650 TABLET ORAL ONCE
Status: COMPLETED | OUTPATIENT
Start: 2025-07-09 | End: 2025-07-09

## 2025-07-09 RX ORDER — NITROFURANTOIN 25; 75 MG/1; MG/1
100 CAPSULE ORAL 2 TIMES DAILY
Qty: 14 CAPSULE | Refills: 0 | Status: SHIPPED | OUTPATIENT
Start: 2025-07-09 | End: 2025-07-09 | Stop reason: CLARIF

## 2025-07-09 NOTE — DISCHARGE INSTRUCTIONS
The ultrasound shows no blood clot in the leg, but this does not assess the muscles, tendons, ligaments, and other structures which could also be injured.  I recommend rest, elevation when at rest, application of a heat pack 3 times a day, 10 minutes each time, with a barrier such as a towel between the skin and the heat pack so as to prevent skin injury, no heavy lifting, no straining, only light walking, until following up with your primary care physician in the next 3 to 4 days for reassessment and for further recommendations.  With any new, changing, progressing signs or symptoms, I do recommend immediate assessment.    Your blood pressure was elevated today for which I recommend follow-up with your primary care physician for reassessment and for further recommendations

## 2025-07-09 NOTE — ED INITIAL ASSESSMENT (HPI)
Pt presents to the IC with c/o right hamstring pain since yesterday. Pt notes a gradual onset and cannot recall a specific incident. The pt is a  and has increasing difficulty sitting while driving and getting in/out of the truck d/t the pain. No numbness/tingling. Advil taken for pain.

## 2025-07-09 NOTE — ED PROVIDER NOTES
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  He states he has previously had similar issue about a year ago for which he did follow with physical therapy.  He denies any personal history of previous DVT or PE, but states his mother did have a history of DVT, no personal history of cancer, not on hormone replacement therapy, but does do long drives as a .  He was sent home from work yesterday, as he could not perform his duties as driving is uncomfortable, sitting is uncomfortable, standing is uncomfortable.        Objective:     Past Medical History:    Unspecified essential hypertension              History reviewed. No pertinent surgical history.             Social History     Socioeconomic History    Marital status: Single   Tobacco Use    Smoking status: Never    Smokeless tobacco: Never   Vaping Use    Vaping status: Never Used   Substance and Sexual Activity    Alcohol use: No    Drug use: No              Review of Systems    Positive for stated complaint: R leg injury/pain  Other systems are as noted in HPI.  Constitutional and vital signs reviewed.      All other systems reviewed and negative except as noted above.                  Physical Exam    ED Triage Vitals [07/09/25 1004]   BP (!) 173/76   Pulse 56   Resp 16   Temp 97.7 °F (36.5 °C)   Temp src Oral   SpO2 96 %   O2 Device None (Room air)       Current Vitals:   Vital Signs  BP: (!) 189/79  Pulse: 52  Resp: 16  Temp: 97.7 °F (36.5 °C)  Temp src: Oral    Oxygen Therapy  SpO2: 99 %  O2 Device: None (Room air)            Physical  Exam    General: Awake, alert, comfortable on room air, in no distress, tolerating oral secretions, interactive  Pulmonary: No conversational dyspnea  Cardiac: +2 B/L regular radial, PT, and DP pulses, no B/L LE edema  Neuro: Symmetrical facial expressions on gross observation  Musculoskeletal: 5/5 B/L plantarflexion and dorsiflexion and active knee flexion and extension, active knee flexion and extension against resistance is reportedly mildly painful, soft compartments throughout the right lower extremity, negative anterior and posterior knee drawer signs, no high riding or low riding patella, no reproducible TTP throughout the right lower extremity although patient points to the right hamstrings as to the area of his pain, soft compartments of the right lower extremity, there are distal varicose veins in the right calf which he states are chronic and have never bothered him, ambulates with mild limp, pain exacerbated with standing  HEENT: No periorbital edema or erythema  Skin: No erythema or rash of the right lower extremity with no signs of skin trauma  Psych: Normal mood, normal affect        ED Course  Copy of radiology report of patient's right lower extremity ultrasound/DVT study:  US VENOUS DOPPLER LEG RIGHT - DIAG IMG (CPT=93971)  Result Date: 7/9/2025  CONCLUSION: No sonographic evidence of bilateral lower extremity DVT. Electronically Verified and Signed by Attending Radiologist: Adair Colin MD 7/9/2025 11:08 AM Workstation: [id number]       MDM  Patient is awake, alert, comfortable on room air, in no distress, afebrile, lower extremities appear symmetrical, he is neurovascular intact with soft compartments, there is no reproducible TTP throughout the right lower extremity, active flexion and extension of the right knee against resistance is mildly painful, ambulates with a mild limp, pain exacerbated with standing, no sign of cellulitis, no sign of shingles, no sign of patellar tendon or  quadriceps tendon rupture, with no high riding or low riding patella and no reported trauma, he does have varicose veins of the distal right calf which he states are chronic and do not bother him, no reproducible tenderness and no trauma and patient performs long drives for work as a , will assess for potential DVT, no signs of Baker's cyst, no indication for x-ray imaging at this time  - Patient took ibuprofen this morning, agreeable to oral Tylenol in clinic, and therefore Tylenol was administered, no reported medication allergies or liver disease, and we discussed that he cannot take another dose until it has been 6 hours since the dose administered in clinic today  - Per my review of the radiology report of the patient's right lower extremity DVT ultrasound, there is no DVT.  This was discussed with the patient  - We discussed unclear etiology to his symptoms, but his story is concerning for potential muscle strain, patient is ambulating with mild limp, unable to perform duties at work due to pain, he is not to use the right lower extremity other than for light walking with no lifting or straining or going up and down steps until following up with his primary care physician for reassessment and for further recommendations, he confirms earliest appointment he was able to schedule for is 7/15/2025, work note provided  - We discussed symptomatic management with rest, elevation when at rest, safe application of heat, limited activity as above, and to follow-up with his primary care physician for reassessment and for further recommendations as he may require further imaging versus specialist assessment versus physical therapy all depending on his clinical course and primary care physician recommendations.  With any new, changing, or progressing signs or symptoms, to be immediately reassessed.  -Please note that Macrobid was prescribed in error, and Macrobid was therefore cancelled immediately, I also  called the patient's pharmacy and confirmed by Courtney that this prescription has been canceled and removed.  Patient was informed of this error and that medication has been canceled and removed.    -Patient's blood pressure was elevated on two assessments, he states he did not take his blood pressure medication this morning, we did discuss that he should follow-up with his primary care physician for reassessment and for further recommendations      Medical Decision Making  Amount and/or Complexity of Data Reviewed  Radiology: ordered.    Risk  OTC drugs.        Disposition and Plan     Clinical Impression:  1. Pain of right lower extremity    2. Elevated blood pressure reading         Disposition:  Discharge  7/9/2025 11:25 am    Follow-up:  Holly Flores MD  [address] [address] [phone]    In 3 days  for reassessment and for further recommendations as well as for elevated blood pressure reading          Medications Prescribed:  Discharge Medication List as of 7/9/2025 11:26 AM              None

## (undated) NOTE — LETTER
Date & Time: 3/3/2021, 9:16 AM  Patient: Josr Larry  Encounter Provider(s):    Jeffrey Beltrán PA-C       To Whom It May Concern:    Sheryle Margo was seen and treated in our department on 3/1/2021.  He may return to work once the sutures are removed

## (undated) NOTE — LETTER
Date & Time: 3/1/2021, 3:21 PM  Patient: Ashley Francisco  Encounter Provider(s):    Elizabeth Khan PA-C       To Whom It May Concern:    Rafa Myao was seen and treated in our department on 3/1/2021. He can return to work 3/3/2021.     If you have any

## (undated) NOTE — LETTER
Date & Time: 1/6/2023, 12:15 PM  Patient: Kostas Cabral  Encounter Provider(s):    Danika Manjarrez MD       To Whom It May Concern:    Jenny Nboles was seen and treated in our department on 1/6/2023. He has been diagnosed with COVID and cannot work until Wednesday 1/11 and should remain strictly masked until 1/16.  .    If you have any questions or concerns, please do not hesitate to call.        _____________________________  Physician/APC Signature

## (undated) NOTE — LETTER
Date: 8/28/2024    Patient Name: Salvador Vergara          To Whom it may concern:    This letter has been written at the patient's request. The above patient was seen at [facility] for treatment of a medical condition.    Salvador may return to work, in a sitting position only, for the next one month.  He will follow up in office, and more information will follow.        Sincerely,    Yessi Raygoza PA-C

## (undated) NOTE — LETTER
Date & Time: 7/9/2025, 11:25 AM  Patient: Salvador Vergara  Encounter Provider(s):    Gabi Arredondo DO       To Whom It May Concern:    Salvador Vergara was seen and treated in our department on 7/9/2025. He should not perform any lifting, straining, and should not be going up or down stairs until following up with his primary care physician for reassessment and for further recommendations.      ____Gabi Arredondo DO_________________________  Physician/APC Signature

## (undated) NOTE — LETTER
Date: 7/31/2024    Patient Name: Salvador Vergara          To Whom it may concern:    The above patient was seen at [facility] for treatment of a medical condition and is currently under my medical care.      The patient may return to work with the following restrictions sit down work only and  no lifting/carrying. These restrictions will stay in place for the next 3 weeks, he will follow up on his next appointment and be re-evaluated at that time. If you have any questions please contact our office at [phone].        Sincerely,    Yessi Raygoza PA-C

## (undated) NOTE — LETTER
Date & Time: 3/8/2021, 12:21 PM  Patient: Ajit Cerda [id number]  Encounter Provider(s):    Devorah Trujillo MD       To Whom It May Concern:    Kennth Gosselin was seen and treated in our department on 3/8/2021.  He can return to work with these limitations: Keep w

## (undated) NOTE — LETTER
Date: 9/26/2024    Patient Name: Salvador Vergara          To Whom it may concern:    This letter has been written at the patient's request. The above patient was seen at [facility] for treatment of a medical condition.    The patient may return to work on September 30,2024 with no restrictions.         Sincerely,    Yessi Raygoza PA-C

## (undated) NOTE — LETTER
Date & Time: 3/8/2021, 12:32 PM  Patient: Jane Johnston PSCQIB  Encounter Provider(s):    Vee Avalos MD       To Whom It May Concern:    Marilee Baumann was seen and treated in our department on 3/9/2021.  He can return to work with these limitations: Keep w

## (undated) NOTE — LETTER
Date & Time: 7/18/2024, 11:57 AM  Patient: Salvador Vergara  Encounter Provider(s):    Jamie Campbell MD       To Whom It May Concern:    Salvador Vergara was seen and treated in our department on 7/18/2024. He should not return to work until 7/22/2024 .    If you have any questions or concerns, please do not hesitate to call.        _____________________________  Physician/APC Signature

## (undated) NOTE — LETTER
Date & Time: 7/9/2025, 11:34 AM  Patient: Salvador Vergara  Encounter Provider(s):    Gabi Arredondo DO       To Whom It May Concern:    Salvador Vergara was seen and treated in our department on 7/9/2025. He should not perform any lifting, straining, and should not be going up or down stairs as well as should not return to work until follow-up with his primary care physician on 7/15/2025 for reassessment and for further recommendation.      __Gabi Arredondo DO___________________________  Physician/APC Signature